# Patient Record
Sex: FEMALE | Race: WHITE | NOT HISPANIC OR LATINO | Employment: OTHER | URBAN - METROPOLITAN AREA
[De-identification: names, ages, dates, MRNs, and addresses within clinical notes are randomized per-mention and may not be internally consistent; named-entity substitution may affect disease eponyms.]

---

## 2021-05-24 ENCOUNTER — RECORDS - HEALTHEAST (OUTPATIENT)
Dept: ADMINISTRATIVE | Facility: CLINIC | Age: 71
End: 2021-05-24

## 2021-07-13 ENCOUNTER — RECORDS - HEALTHEAST (OUTPATIENT)
Dept: ADMINISTRATIVE | Facility: CLINIC | Age: 71
End: 2021-07-13

## 2024-01-04 ENCOUNTER — OFFICE VISIT (OUTPATIENT)
Dept: FAMILY MEDICINE | Facility: CLINIC | Age: 74
End: 2024-01-04
Payer: COMMERCIAL

## 2024-01-04 VITALS
HEART RATE: 63 BPM | SYSTOLIC BLOOD PRESSURE: 115 MMHG | TEMPERATURE: 98.2 F | OXYGEN SATURATION: 96 % | DIASTOLIC BLOOD PRESSURE: 71 MMHG | RESPIRATION RATE: 14 BRPM

## 2024-01-04 DIAGNOSIS — R29.810 FACIAL WEAKNESS: Primary | ICD-10-CM

## 2024-01-04 PROCEDURE — 99204 OFFICE O/P NEW MOD 45 MIN: CPT | Performed by: PHYSICIAN ASSISTANT

## 2024-01-04 RX ORDER — MINERAL OIL, PETROLATUM 425; 568 MG/G; MG/G
1 OINTMENT OPHTHALMIC AT BEDTIME
Qty: 3.5 G | Refills: 1 | Status: SHIPPED | OUTPATIENT
Start: 2024-01-04

## 2024-01-04 NOTE — PROGRESS NOTES
"Assessment & Plan     Facial weakness: discussed with pt exam findings of facial weakness.    Sx present 1 week. Concern for central cause given normal forehead strength.    Discussed with [facility] provider Dr. Maame Clarke who is agreeable to see her tomorrow.    Eye care discussed.    Follow-up with [facility] tomorrow. Discussed red flags and indication to go to the ER.     - White Petrolatum-Mineral Oil (REFRESH LACRI-LUBE) OINT  Dispense: 3.5 g; Refill: 1         Mahnaz Byrd PA-C  [facility] LATOYA Weiner is a 73 year old female who presents to clinic today for the following health issues:  Chief Complaint   Patient presents with    Eye Problem     Rt eye started little over 1 week. More watery yesterday and today.     HPI  Pleasant 73 year old female, without prior medical history, presents with concern for \"possible pink eye\".    Irritated initially, felt like there was an eye lasth in there initially, irritated and watery at times. No eye pain. Feels like she can't shut eye tightly.     B cataracts surgery.    Watery at times through the day. No purulent discharge     Not closing as well.    Denies any numbness of the face.   No rash.   No motor changes of UE or LE.    No hearing change.    No taste change.  No difficulty swallowing.      Review of Systems  Constitutional, HEENT, cardiovascular, pulmonary, gi and gu systems are negative, except as otherwise noted.    Patient Active Problem List   Diagnosis    Benign Paroxysmal Positional Vertigo    Osteopenia   Pt has no pcp, has received no medical care for many years.   pcp at [place] and pt would like to establish care here at [facility].          Objective    /71   Pulse 63   Temp 98.2  F (36.8  C) (Oral)   Resp 14   SpO2 96%   Physical Exam     PERRL  EOMI  Facial weakness as noted below, normal forehead movement, weakness with smile and unable to close eyes tightly.    Tongue midline.   Normal " facial sensation.   Hearing grossly intact,   No rash.    Full AROM at the UE and LE. Pt ambulates well.

## 2024-01-05 ENCOUNTER — APPOINTMENT (OUTPATIENT)
Dept: MRI IMAGING | Facility: HOSPITAL | Age: 74
End: 2024-01-05
Attending: EMERGENCY MEDICINE
Payer: COMMERCIAL

## 2024-01-05 ENCOUNTER — HOSPITAL ENCOUNTER (EMERGENCY)
Facility: HOSPITAL | Age: 74
Discharge: HOME | End: 2024-01-05
Attending: EMERGENCY MEDICINE | Admitting: EMERGENCY MEDICINE
Payer: COMMERCIAL

## 2024-01-05 VITALS
TEMPERATURE: 97.9 F | HEIGHT: 65 IN | BODY MASS INDEX: 18.33 KG/M2 | WEIGHT: 110 LBS | OXYGEN SATURATION: 97 % | HEART RATE: 62 BPM | DIASTOLIC BLOOD PRESSURE: 72 MMHG | RESPIRATION RATE: 18 BRPM | SYSTOLIC BLOOD PRESSURE: 132 MMHG

## 2024-01-05 DIAGNOSIS — G51.0 BELL'S PALSY: ICD-10-CM

## 2024-01-05 DIAGNOSIS — R29.810 FACIAL DROOP: ICD-10-CM

## 2024-01-05 LAB
ANION GAP SERPL CALCULATED.3IONS-SCNC: 8 MMOL/L (ref 7–15)
BUN SERPL-MCNC: 18.4 MG/DL (ref 8–23)
CALCIUM SERPL-MCNC: 9.3 MG/DL (ref 8.8–10.2)
CHLORIDE SERPL-SCNC: 100 MMOL/L (ref 98–107)
CREAT SERPL-MCNC: 0.72 MG/DL (ref 0.51–0.95)
DEPRECATED HCO3 PLAS-SCNC: 29 MMOL/L (ref 22–29)
EGFRCR SERPLBLD CKD-EPI 2021: 89 ML/MIN/1.73M2
ERYTHROCYTE [DISTWIDTH] IN BLOOD BY AUTOMATED COUNT: 12.1 % (ref 10–15)
GLUCOSE SERPL-MCNC: 89 MG/DL (ref 70–99)
HCT VFR BLD AUTO: 37.6 % (ref 35–47)
HGB BLD-MCNC: 13 G/DL (ref 11.7–15.7)
MCH RBC QN AUTO: 32.2 PG (ref 26.5–33)
MCHC RBC AUTO-ENTMCNC: 34.6 G/DL (ref 31.5–36.5)
MCV RBC AUTO: 93 FL (ref 78–100)
PLATELET # BLD AUTO: 239 10E3/UL (ref 150–450)
POTASSIUM SERPL-SCNC: 3.7 MMOL/L (ref 3.4–5.3)
RBC # BLD AUTO: 4.04 10E6/UL (ref 3.8–5.2)
SODIUM SERPL-SCNC: 137 MMOL/L (ref 135–145)
WBC # BLD AUTO: 4.5 10E3/UL (ref 4–11)

## 2024-01-05 PROCEDURE — 255N000002 HC RX 255 OP 636: Performed by: EMERGENCY MEDICINE

## 2024-01-05 PROCEDURE — A9585 GADOBUTROL INJECTION: HCPCS | Performed by: EMERGENCY MEDICINE

## 2024-01-05 PROCEDURE — 93005 ELECTROCARDIOGRAM TRACING: CPT | Performed by: EMERGENCY MEDICINE

## 2024-01-05 PROCEDURE — 36415 COLL VENOUS BLD VENIPUNCTURE: CPT | Performed by: EMERGENCY MEDICINE

## 2024-01-05 PROCEDURE — 70544 MR ANGIOGRAPHY HEAD W/O DYE: CPT

## 2024-01-05 PROCEDURE — 99285 EMERGENCY DEPT VISIT HI MDM: CPT | Mod: 25

## 2024-01-05 PROCEDURE — 85027 COMPLETE CBC AUTOMATED: CPT | Performed by: EMERGENCY MEDICINE

## 2024-01-05 PROCEDURE — 70553 MRI BRAIN STEM W/O & W/DYE: CPT

## 2024-01-05 PROCEDURE — 80048 BASIC METABOLIC PNL TOTAL CA: CPT | Performed by: EMERGENCY MEDICINE

## 2024-01-05 PROCEDURE — 70549 MR ANGIOGRAPH NECK W/O&W/DYE: CPT

## 2024-01-05 RX ORDER — GADOBUTROL 604.72 MG/ML
5 INJECTION INTRAVENOUS ONCE
Status: COMPLETED | OUTPATIENT
Start: 2024-01-05 | End: 2024-01-05

## 2024-01-05 RX ADMIN — GADOBUTROL 5 ML: 604.72 INJECTION INTRAVENOUS at 18:26

## 2024-01-05 NOTE — ED TRIAGE NOTES
Patient presents here as a referral from the clinic for evaluation of a dry right eye. She was seen yesterday at her clinic. She was found to have difficulty controlling her right eye lid and difficulty blinking. Her smile is drooping on the right side. Her symptoms have occurred over one week.

## 2024-01-05 NOTE — ED PROVIDER NOTES
EMERGENCY DEPARTMENT ENCOUNTER      NAME: Regine Gan  AGE: 73 year old female  YOB: 1950  MRN: [id number]  EVALUATION DATE & TIME: No admission date for patient encounter.    PCP: No Ref-Primary, Physician    ED PROVIDER: Tiffanie Brar MD    Chief Complaint   Patient presents with    Facial Droop         FINAL IMPRESSION:  1. Facial droop          ED COURSE & MEDICAL DECISION MAKING:    Pertinent Labs & Imaging studies reviewed. (See chart for details)  73 year old female with history of BPPV, osteopenia who presents to the Emergency Department for evaluation of approximately 1 week of right-sided facial droop.  On examination has right facial droop of the lower face with some decreased ability to close the right eye.  Neurologic examination is otherwise nonfocal.  Differential includes CVA, intracranial mass lesion, aneurysm.  This is less consistent with Bell's palsy given lack of forehead involvement.    Patient initially seen eval by myself in triage area due to boarding crisis.  Twelve-lead EKG shows sinus bradycardia.  CBC, BMP, MRI/MRI of the brain and neck ordered and are pending at time this dictation.  Patient signed out to oncoming physician awaiting results of same.    ED Course as of 01/05/24 1804   Fri Jan 05, 2024   1518 I met with patient for initial interview and encounter. We also discussed plan for treatment and diagnostic interventions.        Medical Decision Making    History:  Supplemental history from: N/A  External Record(s) reviewed: Office visit yesterday for facial weakness    Work Up:  Chart documentation includes differential considered and any EKGs or imaging independently interpreted by provider, see MDM  In additional to work up documented, I considered the following work up: see MDM    External consultation:  Discussion of management with another provider: N/A    Complicating factors:  Care impacted by chronic illness: BPPV  Care affected by social determinants  [facility]: Access to Medical Care referred to ED    Disposition considerations: Admission considered. Patient was signed out to the oncoming physician, disposition pending.    At the conclusion of the encounter I discussed the results of all of the tests and the disposition. The questions were answered. The patient or family acknowledged understanding and was agreeable with the care plan.      MEDICATIONS GIVEN IN THE EMERGENCY:  Medications - No data to display    NEW PRESCRIPTIONS STARTED AT TODAY'S ER VISIT  New Prescriptions    No medications on file        =================================================================    HPI    Patient information was obtained from: Patient    Use of Intrepreter: N/A     Regine Gan is a 73 year old female with pertinent medical history of BPPV who presents to this ED by private car for evaluation of facial droop.    Patient noticed a facial droop on the right side of her face since 12/27/23. She followed up with a clinic today at 1PM and was then sent here. She reports watery eyes and photophobia but denies blurry vision, speech changes, numbness, or paresthesias.     Per chart review, patient was seen at [facility] in [place] yesterday for evaluation of this facial weakness.  Placed referral for [facility]S provider Dr. Maame Clarke who was agreeable to see her today.      PAST MEDICAL HISTORY:  History reviewed. No pertinent past medical history.    PAST SURGICAL HISTORY:  History reviewed. No pertinent surgical history.    CURRENT MEDICATIONS:    Cannot display prior to admission medications because the patient has not been admitted in this contact.       ALLERGIES:  No Known Allergies    FAMILY HISTORY:  No family history on file.    SOCIAL HISTORY:        VITALS:  Patient Vitals for the past 24 hrs:   BP Temp Temp src Pulse Resp SpO2 Height Weight   01/05/24 1744 134/76 97.9  F (36.6  C) Oral 63 18 97 % -- --   01/05/24 1318 122/60 97.7  F (36.5  C) Oral 67  "16 98 % 1.651 m (5' 5\") 49.9 kg (110 lb)       PHYSICAL EXAM    General Appearance: Well-appearing, well-nourished, no acute distress  Head:  Normocephalic, atraumatic  Eyes:  PERRL, conjunctiva/corneas clear, EOM's intact  ENT:  membranes are moist without pallor  Cardio:  Regular rate and rhythm  Pulm:  No respiratory distress  Extremities: Normal gait  Skin:  Skin warm, dry, no rashes  Neuro:  Alert and oriented ×3, moving all extremities, mild right-sided facial droop with flattening of the right nasolabial fold, cannot close right eye as well as left.     [employer] Stroke Scale  Exam Interval: Baseline   Score    Level of consciousness: (0)   Alert, keenly responsive    LOC questions: (0)   Answers both questions correctly    LOC commands: (0)   Performs both tasks correctly    Best gaze: (0)   Normal    Visual: (0)   No visual loss    Facial palsy: (2)   Partial paralysis (total/near total of lower face)    Motor arm (left): (0)   No drift    Motor arm (right): (0)   No drift    Motor leg (left): (0)   No drift    Motor leg (right): (0)   No drift    Limb ataxia: (0)   Absent    Sensory: (0)   Normal- no sensory loss    Best language: (0)   Normal- no aphasia    Dysarthria: (0)   Normal    Extinction and inattention: (0)   No abnormality        Total Score:  1     RADIOLOGY/LABS:  Reviewed all pertinent imaging. Please see official radiology report. All pertinent labs reviewed and interpreted.         EKG:  Performed at: 1/5/2024 at 15:43    Impression: Sinus bradycardia    Rate: 57 bpm  Rhythm: Sinus  Axis: 26  MA Interval: 184 ms  QRS Interval: 82 ms  QTc Interval: 430 ms  ST Changes: None  Comparison: No previous available for comparison.     I have independently reviewed and interpreted the EKG(s) documented above.      I, Vargas Dolan, am serving as a scribe to document services personally performed by Tiffanie Brar MD, based on my observations and the provider's statements to me. I, " Tiffanie Brar MD, attest that Vargas Dolan is acting in a scribe capacity, has observed my performance of the services and has documented them in accordance with my direction.    Tiffanie Brar MD  Emergency Medicine  [facility] EMERGENCY DEPARTMENT  [address] [place] [id number]  [phone]  Dept: [phone]     Tiffanie Brar MD  01/05/24 4431

## 2024-01-06 NOTE — DISCHARGE INSTRUCTIONS
MRI showed no stroke.  This is likely an atypical Bell's palsy.  You can tape your eye shut at night and use rewetting drops for dry eye.  Follow-up with your primary care doctor.

## 2024-01-07 LAB
ATRIAL RATE - MUSE: 57 BPM
DIASTOLIC BLOOD PRESSURE - MUSE: NORMAL MMHG
INTERPRETATION ECG - MUSE: NORMAL
P AXIS - MUSE: 77 DEGREES
PR INTERVAL - MUSE: 184 MS
QRS DURATION - MUSE: 82 MS
QT - MUSE: 442 MS
QTC - MUSE: 430 MS
R AXIS - MUSE: 26 DEGREES
SYSTOLIC BLOOD PRESSURE - MUSE: NORMAL MMHG
T AXIS - MUSE: 77 DEGREES
VENTRICULAR RATE- MUSE: 57 BPM

## 2024-03-02 ENCOUNTER — HEALTH MAINTENANCE LETTER (OUTPATIENT)
Age: 74
End: 2024-03-02

## 2025-03-15 ENCOUNTER — HEALTH MAINTENANCE LETTER (OUTPATIENT)
Age: 75
End: 2025-03-15